# Patient Record
Sex: MALE | Race: WHITE | NOT HISPANIC OR LATINO | URBAN - METROPOLITAN AREA
[De-identification: names, ages, dates, MRNs, and addresses within clinical notes are randomized per-mention and may not be internally consistent; named-entity substitution may affect disease eponyms.]

---

## 2018-04-06 ENCOUNTER — OFFICE VISIT (OUTPATIENT)
Dept: URGENT CARE | Facility: PHYSICIAN GROUP | Age: 53
End: 2018-04-06

## 2018-04-06 ENCOUNTER — HOSPITAL ENCOUNTER (OUTPATIENT)
Dept: RADIOLOGY | Facility: MEDICAL CENTER | Age: 53
End: 2018-04-06
Attending: FAMILY MEDICINE

## 2018-04-06 VITALS
HEART RATE: 102 BPM | HEIGHT: 73 IN | RESPIRATION RATE: 14 BRPM | WEIGHT: 250 LBS | OXYGEN SATURATION: 94 % | BODY MASS INDEX: 33.13 KG/M2 | DIASTOLIC BLOOD PRESSURE: 82 MMHG | SYSTOLIC BLOOD PRESSURE: 126 MMHG | TEMPERATURE: 98.5 F

## 2018-04-06 DIAGNOSIS — R07.9 CHEST PAIN, UNSPECIFIED TYPE: ICD-10-CM

## 2018-04-06 PROCEDURE — 99204 OFFICE O/P NEW MOD 45 MIN: CPT | Performed by: FAMILY MEDICINE

## 2018-04-06 PROCEDURE — 71046 X-RAY EXAM CHEST 2 VIEWS: CPT

## 2018-04-06 RX ORDER — LORATADINE 10 MG/1
10 TABLET ORAL DAILY
COMMUNITY

## 2018-04-06 RX ORDER — OMEPRAZOLE 20 MG/1
20 CAPSULE, DELAYED RELEASE ORAL DAILY
COMMUNITY

## 2018-04-06 NOTE — PROGRESS NOTES
"Subjective:      Chief Complaint   Patient presents with   • Cough     chest pain, ongoing.                      Chest Pain   This is a recurrent problem. Episode onset: 1 month.   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   He was seen 2 wks ago for cough and chest pain and diagnosed with bronchitis and given azithromycin and states that the cough resolved, but continues to have sharp, left-sided chest pain, worse with deep breathing.        Patient has tried nothing for the symptoms.          Past medical history was unremarkable and not pertinent to current issue  Social hx - denies tobacco, alcohol, drug use  Family hx was reviewed -  + MI in father age 57    No past medical history on file.      Social History   Substance Use Topics   • Smoking status: Never Smoker   • Smokeless tobacco: Never Used   • Alcohol use Not on file         No current outpatient prescriptions on file prior to visit.     No current facility-administered medications on file prior to visit.              Review of Systems   Constitutional: Positive for malaise/fatigue. Negative for fever.   Respiratory: Positive for shortness of breath. Negative for cough.    Cardiovascular: Positive for chest pain. Negative for claudication, syncope and near-syncope.   Gastrointestinal: Negative for nausea and vomiting.   Neurological: Positive for headaches (chronic migranes). Negative for numbness.          Objective:     Blood pressure 126/82, pulse (!) 102, temperature 36.9 °C (98.5 °F), resp. rate 14, height 1.854 m (6' 1\"), weight 113.4 kg (250 lb), SpO2 94 %.      Physical Exam   Constitutional: pt is oriented to person, place, and time. Pt appears well-developed and well-nourished.   HENT:   Head: Normocephalic and atraumatic.   Mouth/Throat: Oropharynx is clear and moist.   Eyes: Conjunctivae and EOM are normal. Pupils are equal, round, and reactive to light. No scleral icterus.   Neck: Normal range of " motion. Neck supple. No JVD present. No thyromegaly present.   Cardiovascular: Normal rate, regular rhythm, normal heart sounds and intact distal pulses.  Exam reveals no gallop and no friction rub.    No murmur heard.  Pulmonary/Chest: Effort normal and breath sounds normal. No respiratory distress. Pt has no wheezes. Pt has no rales. Pt exhibits no tenderness over the area of clinical concern.   Abdominal: Soft.   Musculoskeletal: Normal range of motion. Pt exhibits no edema.   Lymphadenopathy:     Pt has no cervical adenopathy.   Neurological: pt is alert and oriented to person, place, and time. No cranial nerve deficit.   Skin: Skin is warm and dry. No erythema.   Psychiatric: pt has a normal mood and affect. patient's behavior is normal.       EKG interpretation - normal sinus rhythm. No ST or QRS morphology changes. QT interval is normal. Axis is positive. No signs of ischemia.      4/6/2018 2:38 PM    HISTORY/REASON FOR EXAM:  Chest Pain.      TECHNIQUE/EXAM DESCRIPTION AND NUMBER OF VIEWS:  Two views of the chest.    COMPARISON:  None.    FINDINGS:  The heart is normal in size.  No pulmonary infiltrates or consolidations are noted.  No pleural effusions are appreciated.  Spurring is present throughout the thoracic spine.   Impression       No active disease.   Reading Provider Reading Date   Jennifer Ortiz M.D. Apr 6, 2018   Signing Provider Signing Date Signing Time   Jennifer Ortiz M.D. Apr 6, 2018  2:56 PM       Assessment/Plan:        1. Chest pain, unspecified type    Chest x-ray was personally interpreted and reviewed. No acute cardiopulmonary findings. No pulmonary infiltrates or densities. Cardiac silhouette is normal. No hemidiaphragm elevation. No bony abnormalities.    EKG reassuring, but given lifestyle, personal risk factors, I have a higher index of suspicion for underlying cardiac cause, especially given that I can not easily explain his sx by pneumonia, costochondritis, muscle strain, etc.    Therefore, I ended up recommending transfer to ER, which pt refused.    He understands the risks and signed AMA form.     Trail of voltaren gel in case this is musculoskeletal pain